# Patient Record
Sex: FEMALE | Race: WHITE | Employment: OTHER | URBAN - METROPOLITAN AREA
[De-identification: names, ages, dates, MRNs, and addresses within clinical notes are randomized per-mention and may not be internally consistent; named-entity substitution may affect disease eponyms.]

---

## 2022-03-18 PROBLEM — C64.2 RENAL CANCER, LEFT (HCC): Status: ACTIVE | Noted: 2019-04-14

## 2022-03-18 PROBLEM — N28.89 LEFT RENAL MASS: Status: ACTIVE | Noted: 2018-02-09

## 2022-03-19 PROBLEM — N39.41 URGE INCONTINENCE: Status: ACTIVE | Noted: 2019-05-07

## 2022-03-19 PROBLEM — Z78.0 POSTMENOPAUSAL: Status: ACTIVE | Noted: 2019-05-07

## 2022-03-19 PROBLEM — R35.1 NOCTURIA: Status: ACTIVE | Noted: 2019-05-07

## 2022-03-19 PROBLEM — I48.91 ATRIAL FIBRILLATION (HCC): Status: ACTIVE | Noted: 2018-02-12

## 2022-03-19 PROBLEM — Z90.5 S/P NEPHRECTOMY: Status: ACTIVE | Noted: 2018-02-12

## 2022-03-19 PROBLEM — R35.0 URINARY FREQUENCY: Status: ACTIVE | Noted: 2019-04-14

## 2022-03-19 PROBLEM — N32.81 OAB (OVERACTIVE BLADDER): Status: ACTIVE | Noted: 2019-04-14

## 2022-03-20 PROBLEM — I10 BENIGN HYPERTENSION: Status: ACTIVE | Noted: 2018-02-12

## 2024-06-21 ENCOUNTER — HOSPITAL ENCOUNTER (OUTPATIENT)
Facility: HOSPITAL | Age: 74
Setting detail: OUTPATIENT SURGERY
Discharge: HOME | End: 2024-06-21
Attending: INTERNAL MEDICINE | Admitting: INTERNAL MEDICINE
Payer: MEDICARE

## 2024-06-21 ENCOUNTER — ANESTHESIA EVENT (OUTPATIENT)
Facility: HOSPITAL | Age: 74
End: 2024-06-21
Payer: MEDICARE

## 2024-06-21 ENCOUNTER — ANESTHESIA (OUTPATIENT)
Facility: HOSPITAL | Age: 74
End: 2024-06-21
Payer: MEDICARE

## 2024-06-21 VITALS
SYSTOLIC BLOOD PRESSURE: 167 MMHG | WEIGHT: 208.4 LBS | HEIGHT: 65 IN | HEART RATE: 98 BPM | TEMPERATURE: 97.6 F | BODY MASS INDEX: 34.72 KG/M2 | RESPIRATION RATE: 18 BRPM | OXYGEN SATURATION: 99 % | DIASTOLIC BLOOD PRESSURE: 67 MMHG

## 2024-06-21 PROBLEM — Z86.010 PERSONAL HISTORY OF COLONIC POLYPS: Status: ACTIVE | Noted: 2024-06-21

## 2024-06-21 PROCEDURE — 2709999900 HC NON-CHARGEABLE SUPPLY: Performed by: INTERNAL MEDICINE

## 2024-06-21 PROCEDURE — 6360000002 HC RX W HCPCS: Performed by: NURSE ANESTHETIST, CERTIFIED REGISTERED

## 2024-06-21 PROCEDURE — 7100000011 HC PHASE II RECOVERY - ADDTL 15 MIN: Performed by: INTERNAL MEDICINE

## 2024-06-21 PROCEDURE — 7100000010 HC PHASE II RECOVERY - FIRST 15 MIN: Performed by: INTERNAL MEDICINE

## 2024-06-21 PROCEDURE — 2580000003 HC RX 258: Performed by: INTERNAL MEDICINE

## 2024-06-21 PROCEDURE — 88305 TISSUE EXAM BY PATHOLOGIST: CPT

## 2024-06-21 PROCEDURE — 3600007512: Performed by: INTERNAL MEDICINE

## 2024-06-21 PROCEDURE — 3700000001 HC ADD 15 MINUTES (ANESTHESIA): Performed by: INTERNAL MEDICINE

## 2024-06-21 PROCEDURE — 3700000000 HC ANESTHESIA ATTENDED CARE: Performed by: INTERNAL MEDICINE

## 2024-06-21 PROCEDURE — 3600007502: Performed by: INTERNAL MEDICINE

## 2024-06-21 RX ORDER — SODIUM CHLORIDE 9 MG/ML
INJECTION, SOLUTION INTRAVENOUS CONTINUOUS
Status: DISCONTINUED | OUTPATIENT
Start: 2024-06-21 | End: 2024-06-21 | Stop reason: HOSPADM

## 2024-06-21 RX ORDER — PROPOFOL 10 MG/ML
INJECTION, EMULSION INTRAVENOUS PRN
Status: DISCONTINUED | OUTPATIENT
Start: 2024-06-21 | End: 2024-06-21 | Stop reason: SDUPTHER

## 2024-06-21 RX ORDER — ASPIRIN 81 MG/1
81 TABLET, CHEWABLE ORAL DAILY
COMMUNITY

## 2024-06-21 RX ORDER — CHOLECALCIFEROL (VITAMIN D3) 1250 MCG
CAPSULE ORAL
COMMUNITY

## 2024-06-21 RX ORDER — LIDOCAINE HYDROCHLORIDE 20 MG/ML
INJECTION, SOLUTION INTRAVENOUS PRN
Status: DISCONTINUED | OUTPATIENT
Start: 2024-06-21 | End: 2024-06-21 | Stop reason: SDUPTHER

## 2024-06-21 RX ADMIN — PROPOFOL 50 MG: 10 INJECTION, EMULSION INTRAVENOUS at 16:32

## 2024-06-21 RX ADMIN — PROPOFOL 50 MG: 10 INJECTION, EMULSION INTRAVENOUS at 16:42

## 2024-06-21 RX ADMIN — PROPOFOL 50 MG: 10 INJECTION, EMULSION INTRAVENOUS at 16:50

## 2024-06-21 RX ADMIN — PROPOFOL 50 MG: 10 INJECTION, EMULSION INTRAVENOUS at 16:38

## 2024-06-21 RX ADMIN — PROPOFOL 50 MG: 10 INJECTION, EMULSION INTRAVENOUS at 16:46

## 2024-06-21 RX ADMIN — SODIUM CHLORIDE: 9 INJECTION, SOLUTION INTRAVENOUS at 15:57

## 2024-06-21 RX ADMIN — PROPOFOL 50 MG: 10 INJECTION, EMULSION INTRAVENOUS at 16:36

## 2024-06-21 RX ADMIN — PROPOFOL 100 MG: 10 INJECTION, EMULSION INTRAVENOUS at 16:23

## 2024-06-21 RX ADMIN — LIDOCAINE HYDROCHLORIDE 80 MG: 20 INJECTION, SOLUTION INTRAVENOUS at 16:23

## 2024-06-21 RX ADMIN — PROPOFOL 50 MG: 10 INJECTION, EMULSION INTRAVENOUS at 16:27

## 2024-06-21 ASSESSMENT — LIFESTYLE VARIABLES: SMOKING_STATUS: 0

## 2024-06-21 ASSESSMENT — PAIN - FUNCTIONAL ASSESSMENT
PAIN_FUNCTIONAL_ASSESSMENT: 0-10
PAIN_FUNCTIONAL_ASSESSMENT: NONE - DENIES PAIN
PAIN_FUNCTIONAL_ASSESSMENT: ADULT NONVERBAL PAIN SCALE (NPVS)

## 2024-06-21 NOTE — ANESTHESIA POSTPROCEDURE EVALUATION
Post-Anesthesia Evaluation and Assessment    Cardiovascular Function/Vital Signs  Visit Vitals  BP (!) 167/67   Pulse 98   Temp 36.4 °C (97.6 °F) (Oral)   Resp 18   Ht 1.651 m (5' 5\")   Wt 94.5 kg (208 lb 6.4 oz)   SpO2 99%   BMI 34.68 kg/m²       Patient is status post Procedure(s):  COLONOSCOPY; POLYPECTOMY.    Nausea/Vomiting: Controlled.    Postoperative hydration reviewed and adequate.    Pain:      Managed.    Neurological Status:       At baseline.    Mental Status and Level of Consciousness: Arousable.    Pulmonary Status:       Adequate oxygenation and airway patent.    Complications related to anesthesia: None    Post-anesthesia assessment completed. No concerns.    Patient has met all discharge requirements.    Signed By: Amaya Carlos, REYES - BRANDIN    June 21, 2024

## 2024-06-21 NOTE — H&P
H and P reviewed, completed on paper and handed to RN  Pt seen and examined  No interval change  Vasyl Sumner MD, FACG

## 2024-06-21 NOTE — ANESTHESIA PRE PROCEDURE
Department of Anesthesiology  Preprocedure Note       Name:  Kathy Santiago   Age:  73 y.o.  :  1950                                          MRN:  [id number]         Date:  2024      Surgeon: Surgeon(s):  Vasyl Sumner MD    Procedure: Procedure(s):  COLONOSCOPY    Medications prior to admission:   Prior to Admission medications    Medication Sig Start Date End Date Taking? Authorizing Provider   budesonide-formoterol (SYMBICORT) 160-4.5 MCG/ACT AERO Inhale 2 puffs into the lungs in the morning and 2 puffs in the evening.    Provider, MD Naty   fluocinolone acetonide (SYNALAR) 0.01 % external solution APPLY TO THE SCALP ONCE DAILY FOR 1 WEEK, THEN APPLY EVERY OTHER DAY 23   Provider, MD Naty   estradiol (ESTRACE VAGINAL) 0.1 MG/GM vaginal cream Apply a pea-sized amount of cream to fingertip. Apply cream to urethral/vaginal openings three times a week. Monday, Wednesday, Friday. 23   Jaky Montanez, REYES - CNP   mirabegron (MYRBETRIQ) 50 MG TB24 Take 50 mg by mouth daily 23   Jaky Montanez, REYES - CNP   albuterol sulfate HFA (PROVENTIL;VENTOLIN;PROAIR) 108 (90 Base) MCG/ACT inhaler ceived the following from [facility]: Outside name: albuterol (PROVENTIL HFA, VENTOLIN HFA, PROAIR HFA) 90 mcg/actuation inhaler 21   Automatic Reconciliation, Ar   apixaban (ELIQUIS) 5 MG TABS tablet ceived the following from [facility]: Outside name: ELIQUIS 5 mg tablet 19   Automatic Reconciliation, Ar   cycloSPORINE (RESTASIS) 0.05 % ophthalmic emulsion ceived the following from [facility]: Outside name: RESTASIS 0.05 % dpet 19   Automatic Reconciliation, Ar   fluticasone (FLONASE) 50 MCG/ACT nasal spray SPRAY 1 - 2 SPRAY BY INTRANASAL ROUTE EVERY DAY IN EACH NOSTRIL AS NEEDED 22   Automatic Reconciliation, Ar   hydroCHLOROthiazide (MICROZIDE) 12.5 MG capsule Take 1 capsule by mouth daily

## 2024-06-21 NOTE — PERIOP NOTE
INTRA-OP DIAGNOSTIC CALL OUT BY DR. CORCORAN:  - ASCENDING COLON POLYP X2  - TRANSVERSE COLON POLYP

## 2024-06-21 NOTE — DISCHARGE INSTRUCTIONS
Resume prior diet and meds.       DISCHARGE SUMMARY from Nurse    PATIENT INSTRUCTIONS:    After general anesthesia or intravenous sedation, for 24 hours or while taking prescription Narcotics:  Limit your activities  Do not drive and operate hazardous machinery  Do not make important personal or business decisions  Do  not drink alcoholic beverages  If you have not urinated within 8 hours after discharge, please contact your surgeon on call.    Report the following to your surgeon:  Excessive pain, swelling, redness or odor of or around the surgical area  Temperature over 100.5  Nausea and vomiting lasting longer than 4 hours or if unable to take medications  Any signs of decreased circulation or nerve impairment to extremity: change in color, persistent  numbness, tingling, coldness or increase pain  Any questions    What to do at Home:  Recommended activity: activity as tolerated and no driving for today.    If you experience any of the following symptoms as above, please follow up with Dr. Sumner.    *  Please give a list of your current medications to your Primary Care Provider.    *  Please update this list whenever your medications are discontinued, doses are      changed, or new medications (including over-the-counter products) are added.    *  Please carry medication information at all times in case of emergency situations.    These are general instructions for a healthy lifestyle:    No smoking/ No tobacco products/ Avoid exposure to second hand smoke  Surgeon General's Warning:  Quitting smoking now greatly reduces serious risk to your health.    Obesity, smoking, and sedentary lifestyle greatly increases your risk for illness    A healthy diet, regular physical exercise & weight monitoring are important for maintaining a healthy lifestyle    You may be retaining fluid if you have a history of heart failure or if you experience any of the following symptoms:  Weight gain of 3 pounds or more overnight or 5  pounds in a week, increased swelling in our hands or feet or shortness of breath while lying flat in bed.  Please call your doctor as soon as you notice any of these symptoms; do not wait until your next office visit.    Patient armband removed and shredded.    The discharge information has been reviewed with the patient and spouse.  The patient and spouse verbalized understanding.  Discharge medications reviewed with the patient and spouse and appropriate educational materials and side effects teaching were provided.  ___________________________________________________________________________________________________________________________________

## 2024-06-21 NOTE — BRIEF OP NOTE
Brief Postoperative Note      Patient: Kathy Santiago  YOB: 1950  MRN: [id number]    Date of Procedure: 6/21/2024    Pre-Op Diagnosis: Personal history of colonic polyps [Z86.010]     Post-Op Diagnosis: colon polyps        Assistants: None    Anesthesia: Propofol    Estimated Blood Loss (mL): Minimal    Complications: None    Specimens:   ID Type Source Tests Collected by Time Destination   1 : POLYP - ASCENDING COLON Tissue Colon-Ascending SURGICAL PATHOLOGY Vasyl Sumner MD 6/21/2024 1650    2 : POLYP - TRANSVERSE COLON Tissue Colon-Transverse SURGICAL PATHOLOGY Vasyl Sumner MD 6/21/2024 1654        Findings: Same as Post-Op Diagnosis    Electronically signed by Vasyl Sumner MD, FACG on 6/21/2024 at 5:01 PM

## 2024-06-22 NOTE — PROCEDURES
[address]  [address]                             PROCEDURE NOTE      PATIENT NAME: ALEJANDRA SORIANO             : 1950  MED REC NO: [id number]                       ROOM: [facility]  ACCOUNT NO: [id number]                       ADMIT DATE: 2024  PROVIDER: Vasyl Sumner MD    DATE OF SERVICE:  2024    PREOPERATIVE DIAGNOSES:  History of colon polyps.    POSTOPERATIVE DIAGNOSES:  Colon polyps x3.    PROCEDURES PERFORMED:  Colonoscopy with snare polypectomy and biopsy.    SURGEON:  Vasyl Sumner MD    ASSISTANT:  None.    ANESTHESIA:  Propofol.    ESTIMATED BLOOD LOSS:  None.    SPECIMENS REMOVED:  Colon polyps x3.    INTRAOPERATIVE FINDINGS:  Please see description in detail.     COMPLICATIONS:  None.    IMPLANTS:  None.    INDICATIONS:  History of polyps.    DESCRIPTION IN DETAIL:  With informed consent obtained and placed on the chart, the patient was placed in the left lateral decubitus position.  Oxygen was administered supplementally.  A mild drowsy state was induced and maintained with a propofol based sedation.  After an unremarkable digital rectal exam initially, the adult diagnostic colonoscope was passed through the anus and advanced under direct visualization.  There was marked deformity and angulation in the sigmoid colon and despite prolonged attempts generally using water insufflation only and gentle tip manipulation, I was unable to advance the scope proximally.  The scope was brought down to the rectum.  Air aspirated and another attempt using the same scope was made with the same results unfortunately.  At this point, the endoscope was removed and a pediatric scope set up.  Although this was again difficult and took several minutes just to traverse the distal sigmoid colon.  We were able to pass proximally and ultimately mated to the cecum where landmarks were identified and photographed for

## (undated) DEVICE — CATHETER IV 22GA L1IN BLU POLYUR STR HUB RADPQ PROTCT +

## (undated) DEVICE — SYRINGE MEDICAL 3ML CLEAR PLASTIC STANDARD NON CONTROL LUERLOCK TIP DISPOSABLE

## (undated) DEVICE — KENDALL RADIOLUCENT FOAM MONITORING ELECTRODE RECTANGULAR SHAPE: Brand: KENDALL

## (undated) DEVICE — TUBING, SUCTION, 1/4" X 12', STRAIGHT: Brand: MEDLINE

## (undated) DEVICE — SYRINGE MED 5ML STD CLR PLAS LUERLOCK TIP N CTRL DISP

## (undated) DEVICE — CATHETER PH SUCT 14FR

## (undated) DEVICE — BLUNTFILL: Brand: MONOJECT

## (undated) DEVICE — TOURNIQUET PHLEB W1XL18IN BLU FLAT RL AND BND REUSE FOR IV

## (undated) DEVICE — WRISTBAND ID AD W2.5XL9.5CM RED VYN ADH CLSR UNI-PRINT

## (undated) DEVICE — CANNULA CUSH AD W/ 14FT TBG

## (undated) DEVICE — SOLUTION IV 1000ML 20MEQ K CHL IN 0.9% SOD CHL FLX CONT

## (undated) DEVICE — FORCEPS BX L240CM JAW DIA2.8MM L CAP W/ NDL MIC MESH TOOTH

## (undated) DEVICE — Device

## (undated) DEVICE — SYRINGE 50ML E/T